# Patient Record
Sex: FEMALE | Race: WHITE | Employment: OTHER | URBAN - METROPOLITAN AREA
[De-identification: names, ages, dates, MRNs, and addresses within clinical notes are randomized per-mention and may not be internally consistent; named-entity substitution may affect disease eponyms.]

---
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
## 2018-02-14 ENCOUNTER — OFFICE VISIT (OUTPATIENT)
Dept: FAMILY MEDICINE CLINIC | Age: 18
End: 2018-02-14

## 2018-02-14 VITALS
RESPIRATION RATE: 12 BRPM | HEIGHT: 63 IN | BODY MASS INDEX: 23.39 KG/M2 | HEART RATE: 75 BPM | WEIGHT: 132 LBS | OXYGEN SATURATION: 99 % | DIASTOLIC BLOOD PRESSURE: 68 MMHG | TEMPERATURE: 98.5 F | SYSTOLIC BLOOD PRESSURE: 110 MMHG

## 2018-02-14 DIAGNOSIS — M54.5 ACUTE LOW BACK PAIN, UNSPECIFIED BACK PAIN LATERALITY, WITH SCIATICA PRESENCE UNSPECIFIED: Primary | ICD-10-CM

## 2018-02-14 DIAGNOSIS — Z87.440 HX: UTI (URINARY TRACT INFECTION): ICD-10-CM

## 2018-02-14 DIAGNOSIS — M62.830 BACK SPASM: ICD-10-CM

## 2018-02-14 LAB
BILIRUB UR QL STRIP: NEGATIVE
GLUCOSE UR-MCNC: NEGATIVE MG/DL
KETONES P FAST UR STRIP-MCNC: NEGATIVE MG/DL
PH UR STRIP: 6 [PH] (ref 4.6–8)
PROT UR QL STRIP: NEGATIVE
SP GR UR STRIP: 1.01 (ref 1–1.03)
UA UROBILINOGEN AMB POC: NORMAL (ref 0.2–1)
URINALYSIS CLARITY POC: CLEAR
URINALYSIS COLOR POC: YELLOW
URINE BLOOD POC: NORMAL
URINE LEUKOCYTES POC: NEGATIVE
URINE NITRITES POC: NEGATIVE

## 2018-02-14 RX ORDER — BACLOFEN 10 MG/1
10 TABLET ORAL 3 TIMES DAILY
Qty: 30 TAB | Refills: 0 | Status: SHIPPED | OUTPATIENT
Start: 2018-02-14

## 2018-02-14 RX ORDER — PREDNISONE 10 MG/1
TABLET ORAL
Qty: 21 TAB | Refills: 0 | Status: SHIPPED | OUTPATIENT
Start: 2018-02-14 | End: 2018-04-04 | Stop reason: ALTCHOICE

## 2018-02-14 NOTE — PROGRESS NOTES
Chief Complaint   Patient presents with    LOW BACK PAIN     possible UTI        Results for orders placed or performed in visit on 02/14/18   AMB POC URINALYSIS DIP STICK AUTO W/O MICRO   Result Value Ref Range    Color (UA POC) Yellow     Clarity (UA POC) Clear     Glucose (UA POC) Negative Negative    Bilirubin (UA POC) Negative Negative    Ketones (UA POC) Negative Negative    Specific gravity (UA POC) 1.010 1.001 - 1.035    Blood (UA POC) Trace Negative    pH (UA POC) 6.0 4.6 - 8.0    Protein (UA POC) Negative Negative    Urobilinogen (UA POC) 0.2 mg/dL 0.2 - 1    Nitrites (UA POC) Negative Negative    Leukocyte esterase (UA POC) Negative Negative

## 2018-02-14 NOTE — MR AVS SNAPSHOT
[address] [address] 
[phone] Patient: Rickey Guerra MRN:  :2000 Visit Information Date & Time Provider Department Dept. Phone Encounter #  
 2018  4:00 PM Fatuma Lovell NP [address] [facility] [phone] [id number] Upcoming Health Maintenance Date Due Hepatitis A Peds Age 1-18 (1 of 2 - Standard Series) 1999 DTaP/Tdap/Td series (1 - Tdap) 2005 HPV AGE 9Y-26Y (2 of 3 - Female 3 Dose Series) 3/3/2017 Influenza Age 5 to Adult 2017 Allergies as of 2018  Review Complete On: 2018 By: Tania Silverio LPN No Known Allergies Current Immunizations  Never Reviewed Name Date HPV (9-valent) 2017 Not reviewed this visit You Were Diagnosed With   
  
 Codes Comments Acute low back pain, unspecified back pain laterality, with sciatica presence unspecified    -  Primary ICD-10-CM: M54.5 ICD-9-CM: 724.2 Hx: UTI (urinary tract infection)     ICD-10-CM: B03.315 ICD-9-CM: V13.02 Back spasm     ICD-10-CM: D19.535 ICD-9-CM: 724.8 Vitals BP Pulse Temp Resp Height(growth percentile) Weight(growth percentile) 110/68 (56 %/ 68 %)* 75 98.5 °F (36.9 °C) 12 5' 3\" (1.6 m) (31 %, Z= -0.51) 132 lb (59.9 kg) (58 %, Z= 0.20) SpO2 BMI OB Status Smoking Status 99% 23.38 kg/m2 (68 %, Z= 0.47) Implant Never Smoker *BP percentiles are based on NHBPEP's 4th Report Growth percentiles are based on CDC 2-20 Years data. Vitals History BMI and BSA Data Body Mass Index Body Surface Area  
 23.38 kg/m 2 1.63 m 2 Preferred Pharmacy Pharmacy Name Phone [address] [phone] Your Updated Medication List  
  
   
This list is accurate as of: 18  4:30 PM.  Always use your most recent med list.  
  
  
  
  
 adapalene 0.1 % topical cream  
 Commonly known as:  DIFFERIN  
  
 baclofen 10 mg tablet Commonly known as:  LIORESAL Take 1 Tab by mouth three (3) times daily. As needed for spasm  
  
 clindamycin phosphate 1 % Swab  
  
 escitalopram oxalate 10 mg tablet Commonly known as:  Veola Fujita TAKE ONE TABLET BY MOUTH EVERY DAY  
  
 ibuprofen 600 mg tablet Commonly known as:  MOTRIN  
  
 NEXPLANON 68 mg Impl Generic drug:  etonogestrel  
by SubDERmal route. predniSONE 10 mg dose pack Commonly known as:  STERAPRED DS See administration instruction per 10mg dose pack  
  
 traZODone 100 mg tablet Commonly known as:  Long Emily Take 1 Tab by mouth nightly. Prescriptions Sent to Pharmacy Refills  
 baclofen (LIORESAL) 10 mg tablet 0 Sig: Take 1 Tab by mouth three (3) times daily. As needed for spasm Class: Normal  
 Pharmacy: [address] [place] Ph #: [phone] Route: Oral  
 predniSONE (STERAPRED DS) 10 mg dose pack 0 Sig: See administration instruction per 10mg dose pack Class: Normal  
 [facility] [address] [facility] Ph #: [phone] We Performed the Following AMB POC URINALYSIS DIP STICK AUTO W/O MICRO [16457 CPT(R)] Introducing [facility]! [employer] introduces Oh My Glasses patient portal. Now you can access parts of your medical record, email your doctor's office, and request medication refills online. 1. In your internet browser, go to [email or web address] 2. Click on the First Time User? Click Here link in the Sign In box. You will see the New Member Sign Up page. 3. Enter your Oh My Glasses Access Code exactly as it appears below. You will not need to use this code after youve completed the sign-up process. If you do not sign up before the expiration date, you must request a new code. · Oh My [employer]es Access Code: [email or web address] Expires: 5/15/2018  4:30 PM 
 
 4. Enter the last four digits of your Social Security Number (xxxx) and Date of Birth (mm/dd/yyyy) as indicated and click Submit. You will be taken to the next sign-up page. 5. Create a Focus Media ID. This will be your [employer] login ID and cannot be changed, so think of one that is secure and easy to remember. 6. Create a [employer] password. You can change your password at any time. 7. Enter your Password Reset Question and Answer. This can be used at a later time if you forget your password. 8. Enter your e-mail address. You will receive e-mail notification when new information is available in [address] 9. Click Sign Up. You can now view and download portions of your medical record. 10. Click the Download Summary menu link to download a portable copy of your medical information. If you have questions, please visit the Frequently Asked Questions section of the [employer] website. Remember, [employer] is NOT to be used for urgent needs. For medical emergencies, dial 911. Now available from your iPhone and Android! Please provide this summary of care documentation to your next provider. Your primary care clinician is listed as Zayra Devlin. If you have any questions after today's visit, please call [phone].

## 2018-02-14 NOTE — PROGRESS NOTES
HISTORY OF PRESENT ILLNESS  Magda Palmer is a 21 y.o. female. HPI  Having low back pain and spasms across both sides  No specific injury  nonradiating  Was given muscle relaxers a few months ago at school but did not use consistently  600mg motrin helps some  Wants to make sure not UTI    ROS  A comprehensive review of system was obtained and negative except findings in the HPI    Visit Vitals    /68    Pulse 75    Temp 98.5 °F (36.9 °C)    Resp 12    Ht 5' 3\" (1.6 m)    Wt 132 lb (59.9 kg)    SpO2 99%    BMI 23.38 kg/m2     Physical Exam   Constitutional: She is oriented to person, place, and time. She appears well-developed and well-nourished. Neck: No JVD present. Cardiovascular: Normal rate, regular rhythm and intact distal pulses. Exam reveals no gallop and no friction rub. No murmur heard. Pulmonary/Chest: Effort normal and breath sounds normal. No respiratory distress. She has no wheezes. Musculoskeletal: She exhibits no edema. Neurological: She is alert and oriented to person, place, and time. Skin: Skin is warm. Nursing note and vitals reviewed. ASSESSMENT and PLAN  Encounter Diagnoses   Name Primary?  Acute low back pain, unspecified back pain laterality, with sciatica presence unspecified Yes    Hx: UTI (urinary tract infection)     Back spasm      Orders Placed This Encounter    AMB POC URINALYSIS DIP STICK AUTO W/O MICRO    baclofen (LIORESAL) 10 mg tablet    predniSONE (STERAPRED DS) 10 mg dose pack     Urine dip clean  Will change to baclofen and pred pack   Encouraged massage as well    I have discussed the diagnosis with the patient and the intended plan as seen in the above orders. The patient has received an after-visit summary and questions were answered concerning future plans. Patient conveyed understanding of the plan at the time of the visit.     Sree Barr, MSN, ANP  2/14/2018

## 2018-04-04 ENCOUNTER — OFFICE VISIT (OUTPATIENT)
Dept: FAMILY MEDICINE CLINIC | Age: 18
End: 2018-04-04

## 2018-04-04 VITALS
DIASTOLIC BLOOD PRESSURE: 65 MMHG | SYSTOLIC BLOOD PRESSURE: 101 MMHG | WEIGHT: 132 LBS | OXYGEN SATURATION: 98 % | HEART RATE: 68 BPM | HEIGHT: 62 IN | TEMPERATURE: 98.7 F | RESPIRATION RATE: 16 BRPM | BODY MASS INDEX: 24.29 KG/M2

## 2018-04-04 DIAGNOSIS — G89.29 CHRONIC MIDLINE LOW BACK PAIN WITHOUT SCIATICA: ICD-10-CM

## 2018-04-04 DIAGNOSIS — N64.4 BREAST PAIN, LEFT: ICD-10-CM

## 2018-04-04 DIAGNOSIS — M54.50 CHRONIC MIDLINE LOW BACK PAIN WITHOUT SCIATICA: ICD-10-CM

## 2018-04-04 DIAGNOSIS — S89.90XA KNEE INJURY, INITIAL ENCOUNTER: Primary | ICD-10-CM

## 2018-04-04 RX ORDER — DICLOFENAC SODIUM 75 MG/1
75 TABLET, DELAYED RELEASE ORAL
Qty: 30 TAB | Refills: 0 | Status: SHIPPED | OUTPATIENT
Start: 2018-04-04

## 2018-04-04 NOTE — PROGRESS NOTES
Chief Complaint   Patient presents with    Muscle Pain     Back, since last OV    Knee Pain     Right Knee, x2 week    Breast Problem     Left, x2 days     she is a 21y.o. year old female who presents for evalution. Pt states felt better while on medication for back pain - was given Prednisone and Baclofen. States while taking medication felt better but now course has returned. Pt states has been having problem for about 6-12 months, first started when was in college. Has been taking Ibuprofen prn. Pt was leaning on side of knee, then all of a sudden hear a pop and felt a severe pain. Pt states pain was bad that day, since then has gone down but still present with certain movements. Has been taking Advil, doesn't really change it. Yesterday was doing self exam, felt marble size lump. No tenderness or changes. Just needs to get checked. Reviewed PmHx, RxHx, FmHx, SocHx, AllgHx and updated and dated in the chart. Review of Systems - negative except as listed above in the HPI    Objective:     Vitals:    04/04/18 1456   BP: 101/65   Pulse: 68   Resp: 16   Temp: 98.7 °F (37.1 °C)   TempSrc: Oral   SpO2: 98%   Weight: 132 lb (59.9 kg)   Height: 5' 2\" (1.575 m)     Physical Examination: General appearance - alert, well appearing, and in no distress  Chest - clear to auscultation, no wheezes, rales or rhonchi, symmetric air entry  Heart - normal rate, regular rhythm, normal S1, S2, no murmurs, rubs, clicks or gallops  Breasts - breasts appear normal, no suspicious masses, no skin or nipple changes or axillary nodes  Back exam - limited range of motion, pain with motion noted during exam, tenderness noted lower lumbar, no muscle spasms   Musculoskeletal - abnormal exam of right knee  Twisting movements painful, no swelling or tenderness     Assessment/ Plan:   Diagnoses and all orders for this visit:    1. Knee injury, initial encounter  -     diclofenac EC (VOLTAREN) 75 mg EC tablet;  Take 1 Tab by mouth two (2) times daily (after meals). New rx. Wrapped in ace bandage. Activity modification, gentle stretching, application of ice. F/U prn    2. Chronic midline low back pain without sciatica  -     XR SPINE LUMB 2 OR 3 V; Future  -     REFERRAL TO PHYSICAL THERAPY  Obtain x-ray which will likely be normal. If so, F/U with PT for further treatment and evaluation - suspect may be secondary to way patient is carrying and sitting while playing hard. Stretches given. 3. Breast pain, left   No mass today, reassurance provided - could be secondary to cycle. Pt voiced understanding regarding plan of care. Follow-up Disposition:  Return if symptoms worsen or fail to improve. I have discussed the diagnosis with the patient and the intended plan as seen in the above orders. The patient has received an after-visit summary and questions were answered concerning future plans.      Medication Side Effects and Warnings were discussed with patient    Andrade Ferreira NP

## 2018-04-04 NOTE — PATIENT INSTRUCTIONS

## 2018-04-04 NOTE — PROGRESS NOTES
1. Have you been to the ER, urgent care clinic since your last visit? Hospitalized since your last visit? No    2. Have you seen or consulted any other health care providers outside of the [address] [place] [address] since your last visit? Include any pap smears or colon screening.  No     Chief Complaint   Patient presents with    Muscle Pain     Back, since last OV    Knee Pain     Right Knee, x2 week    Breast Problem     Left, x2 days

## 2018-04-04 NOTE — MR AVS SNAPSHOT
[address] [address] 
[phone] Patient: Hailee Nicole MRN:  :2000 Visit Information Date & Time Provider Department Dept. Phone Encounter #  
 2018  2:45 PM Michael Jansen NP [address] [phone] [id number] Follow-up Instructions Return if symptoms worsen or fail to improve. Upcoming Health Maintenance Date Due Hepatitis A Peds Age 1-18 (1 of 2 - Standard Series) 1999 DTaP/Tdap/Td series (1 - Tdap) 2005 HPV AGE 9Y-26Y (2 of 3 - Female 3 Dose Series) 3/3/2017 Influenza Age 5 to Adult 2017 Allergies as of 2018  Review Complete On: 2018 By: Vi Cardenas LPN No Known Allergies Current Immunizations  Never Reviewed Name Date HPV (9-valent) 2017 Not reviewed this visit You Were Diagnosed With   
  
 Codes Comments Knee injury, initial encounter    -  Primary ICD-10-CM: Z48.21MY ICD-9-CM: 611. 7 Chronic midline low back pain without sciatica     ICD-10-CM: M54.5, G89.29 ICD-9-CM: 724.2, 338.29 Breast pain, left     ICD-10-CM: N64.4 ICD-9-CM: 611.71 Vitals BP Pulse Temp Resp Height(growth percentile) Weight(growth percentile) 101/65 (28 %/ 60 %)* 68 98.7 °F (37.1 °C) (Oral) 16 5' 2\" (1.575 m) (18 %, Z= -0.90) 132 lb (59.9 kg) (57 %, Z= 0.18) SpO2 BMI OB Status Smoking Status 98% 24.14 kg/m2 (74 %, Z= 0.63) Implant Never Smoker *BP percentiles are based on NHBPEP's 4th Report Growth percentiles are based on CDC 2-20 Years data. Vitals History BMI and BSA Data Body Mass Index Body Surface Area  
 24.14 kg/m 2 1.62 m 2 Preferred Pharmacy Pharmacy Name Phone [facility], [address] [place] [address] [phone] Your Updated Medication List  
  
   
This list is accurate as of 18  3:17 PM.  Always use your most recent med list.  
  
  
  
  
 adapalene 0.1 % topical cream  
Commonly known as:  DIFFERIN  
  
 baclofen 10 mg tablet Commonly known as:  LIORESAL Take 1 Tab by mouth three (3) times daily. As needed for spasm  
  
 clindamycin phosphate 1 % Swab  
  
 diclofenac EC 75 mg EC tablet Commonly known as:  VOLTAREN Take 1 Tab by mouth two (2) times daily (after meals). escitalopram oxalate 10 mg tablet Commonly known as:  Celesta Prost TAKE ONE TABLET BY MOUTH EVERY DAY  
  
 NEXPLANON 68 mg Impl Generic drug:  etonogestrel  
by SubDERmal route. traZODone 100 mg tablet Commonly known as:  [place] Take 1 Tab by mouth nightly. Prescriptions Sent to Pharmacy Refills  
 diclofenac EC (VOLTAREN) 75 mg EC tablet 0 Sig: Take 1 Tab by mouth two (2) times daily (after meals). Class: Normal  
 Pharmacy: [address], [address] Ph #: [phone] Route: Oral  
  
We Performed the Following REFERRAL TO PHYSICAL THERAPY [JAW60 Custom] Follow-up Instructions Return if symptoms worsen or fail to improve. To-Do List   
 04/04/2018 Imaging:  XR SPINE LUMB 2 OR 3 V Referral Information Referral ID Referred By Referred To  
  
 [id number] [place], [address] [place] [address] [address] [facility], [facility] Phone: [phone] Visits Status Start Date End Date 1 New Request 4/4/18 4/4/19 If your referral has a status of pending review or denied, additional information will be sent to support the outcome of this decision. Patient Instructions Back Stretches: Exercises Your Care Instructions Here are some examples of exercises for stretching your back. Start each exercise slowly. Ease off the exercise if you start to have pain. Your doctor or physical therapist will tell you when you can start these exercises and which ones will work best for you. How to do the exercises Overhead stretch 1. Stand comfortably with your feet shoulder-width apart. 2. Looking straight ahead, raise both arms over your head and reach toward the ceiling. Do not allow your head to tilt back. 3. Hold for 15 to 30 seconds, then lower your arms to your sides. 4. Repeat 2 to 4 times. Side stretch 1. Stand comfortably with your feet shoulder-width apart. 2. Raise one arm over your head, and then lean to the other side. 3. Slide your hand down your leg as you let the weight of your arm gently stretch your side muscles. Hold for 15 to 30 seconds. 4. Repeat 2 to 4 times on each side. Press-up 1. Lie on your stomach, supporting your body with your forearms. 2. Press your elbows down into the floor to raise your upper back. As you do this, relax your stomach muscles and allow your back to arch without using your back muscles. As your press up, do not let your hips or pelvis come off the floor. 3. Hold for 15 to 30 seconds, then relax. 4. Repeat 2 to 4 times. Relax and rest 
 
1. Lie on your back with a rolled towel under your neck and a pillow under your knees. Extend your arms comfortably to your sides. 2. Relax and breathe normally. 3. Remain in this position for about 10 minutes. 4. If you can, do this 2 or 3 times each day. Follow-up care is a key part of your treatment and safety. Be sure to make and go to all appointments, and call your doctor if you are having problems. It's also a good idea to know your test results and keep a list of the medicines you take. Where can you learn more? Go to http://kalyn-bala.info/. Enter A866 in the search box to learn more about \"Back Stretches: Exercises. \" Current as of: March 21, 2017 Content Version: 11.4 © 9150-6452 Healthwise[employer]. Care instructions adapted under license by "[employer] (which disclaims liability or warranty for this information).  If you have questions about a medical condition or this instruction, always ask your healthcare professional. [facility] any warranty or liability for your use of this information. Introducing [facility]! Jania Valdez introduces [employer] patient portal. Now you can access parts of your medical record, email your doctor's office, and request medication refills online. 1. In your internet browser, go to [email or web address] 2. Click on the First Time User? Click Here link in the Sign In box. You will see the New Member Sign Up page. 3. Enter your [employer] Access Code exactly as it appears below. You will not need to use this code after youve completed the sign-up process. If you do not sign up before the expiration date, you must request a new code. · [employer] Access Code: [email or web address] Expires: 5/15/2018  5:30 PM 
 
4. Enter the last four digits of your Social Security Number (xxxx) and Date of Birth (mm/dd/yyyy) as indicated and click Submit. You will be taken to the next sign-up page. 5. Create a [employer] ID. This will be your [employer] login ID and cannot be changed, so think of one that is secure and easy to remember. 6. Create a [employer] password. You can change your password at any time. 7. Enter your Password Reset Question and Answer. This can be used at a later time if you forget your password. 8. Enter your e-mail address. You will receive e-mail notification when new information is available in [address] 9. Click Sign Up. You can now view and download portions of your medical record. 10. Click the Download Summary menu link to download a portable copy of your medical information. If you have questions, please visit the Frequently Asked Questions section of the [employer] website. Remember, [employer] is NOT to be used for urgent needs. For medical emergencies, dial 911. Now available from your iPhone and Android! Please provide this summary of care documentation to your next provider. Your primary care clinician is listed as Renata Stinson. If you have any questions after today's visit, please call [phone].

## 2018-05-21 ENCOUNTER — TELEPHONE (OUTPATIENT)
Dept: FAMILY MEDICINE CLINIC | Age: 18
End: 2018-05-21

## 2018-05-21 NOTE — TELEPHONE ENCOUNTER
----- Message from Andrew Duran sent at 5/21/2018  2:39 PM EDT -----  Regarding: Np. Bell/ Telephone  Pt is calling to have an X-ray order sent to [facility]. Please call once the order is sent order, she would like for thew x-ray to get done today. She has called several times before. Please fax order to [phone]. Best contact: [phone].

## 2018-05-21 NOTE — TELEPHONE ENCOUNTER
Patient called back checking on status of xray order. I faxed xray order to [facility] @[phone].  Fax confirmation received

## 2018-05-21 NOTE — TELEPHONE ENCOUNTER
----- Message from Janice Warren sent at 5/21/2018 12:08 PM EDT -----  Regarding: BOB Del Cid  Pt needs xray at [facility] today. [facility] still does not have the order.  Pls call pt at [phone]

## 2018-05-23 ENCOUNTER — TELEPHONE (OUTPATIENT)
Dept: FAMILY MEDICINE CLINIC | Age: 18
End: 2018-05-23

## 2018-05-23 NOTE — TELEPHONE ENCOUNTER
Please inform pt x-ray of lower spine was normal.  If still having pain recommend F/U with PT as discussed at appt.    Thanks,  N

## 2018-07-15 RX ORDER — ESCITALOPRAM OXALATE 10 MG/1
TABLET ORAL
Qty: 90 TAB | Refills: 1 | Status: SHIPPED | OUTPATIENT
Start: 2018-07-15

## 2020-02-12 ENCOUNTER — OFFICE VISIT (OUTPATIENT)
Dept: FAMILY MEDICINE CLINIC | Age: 20
End: 2020-02-12

## 2020-02-12 VITALS
BODY MASS INDEX: 26.68 KG/M2 | RESPIRATION RATE: 14 BRPM | SYSTOLIC BLOOD PRESSURE: 97 MMHG | TEMPERATURE: 98.4 F | OXYGEN SATURATION: 98 % | DIASTOLIC BLOOD PRESSURE: 58 MMHG | WEIGHT: 145 LBS | HEIGHT: 62 IN | HEART RATE: 73 BPM

## 2020-02-12 DIAGNOSIS — F41.9 ANXIETY: Primary | ICD-10-CM

## 2020-02-12 NOTE — PROGRESS NOTES
Chief Complaint   Patient presents with    Documentation     Pt in office today for documentation for depression and anxiety  -pt states she needs this for school    1. Have you been to the ER, urgent care clinic since your last visit? Hospitalized since your last visit? No    2. Have you seen or consulted any other health care providers outside of the [address] [place] [address] since your last visit? Include any pap smears or colon screening.  No     Pt has no other concerns

## 2020-02-12 NOTE — PROGRESS NOTES
HISTORY OF PRESENT ILLNESS  Alfreida Merlin is a 22 y.o. female. HPI  Patient is here today to get a form completed for an accomodation to have late work or missed classes due to her severe anxiety and depression  Takes Lexapro intermittenly and trazodone at night for sleep  The course load this year is overwhelming, has classes with a lot of over-stimulation and noise  Focus and concentration issues as well    ROS  A comprehensive review of system was obtained and negative except findings in the HPI    Visit Vitals  BP 97/58 (BP 1 Location: Left arm, BP Patient Position: Sitting)   Pulse 73   Temp 98.4 °F (36.9 °C) (Oral)   Resp 14   Ht 5' 2\" (1.575 m)   Wt 145 lb (65.8 kg)   SpO2 98%   BMI 26.52 kg/m²     Physical Exam  Vitals signs and nursing note reviewed. Constitutional:       Appearance: She is well-developed. Comments:      Neck:      Vascular: No JVD. Cardiovascular:      Rate and Rhythm: Normal rate and regular rhythm. Heart sounds: No murmur. No friction rub. No gallop. Pulmonary:      Effort: Pulmonary effort is normal. No respiratory distress. Breath sounds: Normal breath sounds. No wheezing. Skin:     General: Skin is warm. Neurological:      Mental Status: She is alert and oriented to person, place, and time. ASSESSMENT and PLAN  Encounter Diagnoses   Name Primary?  Anxiety Yes     No orders of the defined types were placed in this encounter. Form completed and faxed to [facility]  Copy given to patient as well and scanned for chart  I have discussed the diagnosis with the patient and the intended plan as seen in the above orders. The patient has received an after-visit summary and questions were answered concerning future plans. Patient conveyed understanding of the plan at the time of the visit.     Beryle Cocks, MSN, ANP  2/12/2020